# Patient Record
(demographics unavailable — no encounter records)

---

## 2024-10-15 NOTE — HISTORY OF PRESENT ILLNESS
[FreeTextEntry1] : Presents for initial eval for R renal lesion.  PMHx Crohn's disease. Pt doing well, NO complaints. nervous about lesion, would like 2nd opinion.  Don't recall last PSA.   CT Abd W 2022:  Multiple renal lesion. A 1.4cm septated lesion at R kidney lower posterior pole suspect complex cyst vs. renal neoplasm.   CT A&P W/WO 6/2024:  Unchanged 1.4cm septated lesion at R kidney lower posterior pole. Rec repeat CT or MRI of kidney at 6mon.   Allergy: NKDA PMHx: Crohn's disease. HTN Meds: Lisinopril. Zoloft 50mg qd.  SurgHx: NO h/o  Surgery SoHx: -Smoke: NO  -ETOH: NO  -Drugs: NO  FamHx: NO  malignancy or stones.